# Patient Record
Sex: FEMALE | Race: WHITE | NOT HISPANIC OR LATINO
[De-identification: names, ages, dates, MRNs, and addresses within clinical notes are randomized per-mention and may not be internally consistent; named-entity substitution may affect disease eponyms.]

---

## 2024-06-05 ENCOUNTER — DASHBOARD ENCOUNTERS (OUTPATIENT)
Age: 69
End: 2024-06-05

## 2024-06-05 ENCOUNTER — OFFICE VISIT (OUTPATIENT)
Dept: URBAN - METROPOLITAN AREA CLINIC 72 | Facility: CLINIC | Age: 69
End: 2024-06-05
Payer: MEDICARE

## 2024-06-05 VITALS
SYSTOLIC BLOOD PRESSURE: 122 MMHG | HEIGHT: 64 IN | BODY MASS INDEX: 28.07 KG/M2 | WEIGHT: 164.4 LBS | TEMPERATURE: 97.9 F | HEART RATE: 94 BPM | DIASTOLIC BLOOD PRESSURE: 63 MMHG

## 2024-06-05 DIAGNOSIS — K57.90 DIVERTICULOSIS: ICD-10-CM

## 2024-06-05 DIAGNOSIS — R16.1 SPLENOMEGALY: ICD-10-CM

## 2024-06-05 DIAGNOSIS — R14.0 ABDOMINAL BLOATING: ICD-10-CM

## 2024-06-05 DIAGNOSIS — K76.0 STEATOSIS, LIVER: ICD-10-CM

## 2024-06-05 PROBLEM — 197321007: Status: ACTIVE | Noted: 2024-06-05

## 2024-06-05 PROBLEM — 397881000: Status: ACTIVE | Noted: 2024-06-05

## 2024-06-05 PROCEDURE — 99203 OFFICE O/P NEW LOW 30 MIN: CPT | Performed by: INTERNAL MEDICINE

## 2024-06-05 RX ORDER — MULTIVIT-MIN/FA/LYCOPEN/LUTEIN .4-300-25
TAKE 1 TABLET DAILY TABLET ORAL
Refills: 0 | Status: ACTIVE | COMMUNITY
Start: 2018-02-13

## 2024-06-05 RX ORDER — LOSARTAN POTASSIUM 50 MG/1
TABLET, FILM COATED ORAL
Qty: 60 TABLET | Status: ACTIVE | COMMUNITY

## 2024-06-05 RX ORDER — FENOFIBRIC ACID 135 MG/1
TAKE 1 CAPSULE DAILY CAPSULE, DELAYED RELEASE ORAL
Refills: 0 | Status: ACTIVE | COMMUNITY
Start: 2018-02-13

## 2024-06-05 RX ORDER — LEVOTHYROXINE SODIUM 0.09 MG/1
TAKE 1 TABLET DAILY TABLET ORAL
Refills: 0 | Status: ACTIVE | COMMUNITY
Start: 2018-02-13

## 2024-06-05 RX ORDER — B-COMPLEX WITH VITAMIN C
TAKE 1 TABLET DAILY TABLET ORAL
Refills: 0 | Status: ACTIVE | COMMUNITY
Start: 2018-02-13

## 2024-06-05 RX ORDER — INSULIN GLARGINE 100 [IU]/ML
INJECT 33 UNITS IN THE AM AND 4 UNITS AT BEDTIME INJECTION, SOLUTION SUBCUTANEOUS
Refills: 0 | Status: ACTIVE | COMMUNITY
Start: 2018-02-13

## 2024-06-05 RX ORDER — CYCLOSPORINE 0.5 MG/ML
INSTILL ONE DROP INTO EACH EYE TWICE DAILY EMULSION OPHTHALMIC
Qty: 60 UNSPECIFIED | Refills: 0 | Status: ACTIVE | COMMUNITY

## 2024-06-05 RX ORDER — LIOTHYRONINE SODIUM 5 UG/1
TAKE 1/2 TABLET DAILY TABLET ORAL
Refills: 0 | Status: ON HOLD | COMMUNITY
Start: 2018-02-13

## 2024-06-05 RX ORDER — INSULIN ASPART 100 [IU]/ML
INJECT 11 UNITS AT BREAKFAST, AND 12 UNITS AT LUNCH INJECTION, SUSPENSION SUBCUTANEOUS
Refills: 0 | Status: ON HOLD | COMMUNITY
Start: 2018-02-13

## 2024-06-05 RX ORDER — ERGOCALCIFEROL 1.25 MG/1
TAKE ONE CAPSULE BY MOUTH EVERY WEEK CAPSULE, LIQUID FILLED ORAL
Qty: 12 UNSPECIFIED | Refills: 2 | Status: ACTIVE | COMMUNITY

## 2024-06-05 RX ORDER — INSULIN ASPART INJECTION 100 [IU]/ML
USE IN DIVIDED DOSE PER CARB INTAKE AS INTRUCTED ON SLIDING SCALE. MAXIMUM DAILY DOSE OF 80 UNITS INJECTION, SOLUTION SUBCUTANEOUS
Qty: 20 UNSPECIFIED | Refills: 5 | Status: ACTIVE | COMMUNITY

## 2024-06-05 NOTE — EXAM-PHYSICAL EXAM
General--no acute distress, resting comfortably Eyes--anicteric, no pallor HENT--normocephalic, atraumatic head Neck--no lymphadenopathy, non tender Chest--non labored breathing, equal rise Abdomen--soft, non tender, non distended, no organomegaly Ext: MILANA, no obvious sores or rashes

## 2024-06-05 NOTE — HPI-TODAY'S VISIT:
Mrs. Persaud is a 68-year-old female who presents as a new patient for consultation for splenomegaly, abdominal distention and fatty liver, she was referred by Dr. Mary Ho, a copy of this consultation will be forwarded to the referring physician.  She has history of type 1 diabetes.  She had a hernia repair in 2021.  She saw her primary care doctor with a complaint of abdominal distention bloating and pain and a CT scan of the abdomen pelvis with contrast was ordered.  Showed a normal liver with some fatty infiltration, no ductal dilatation, 1 or more calcified gallstones in the gallbladder lumen, splenomegaly to 14.68 cm long, diverticulosis without diverticulitis and minimal atherosclerotic changes in the aorta  Lab work 5/16/2023 showed a creatinine of 1.08, sodium 141, bilirubin 0.4, alk phos 73, AST 21, ALT 22, slightly low IgA levels negative celiac profile otherwise She denies any vague abdominal discomfort, no pain particularly.  She queries if it is related to her mesh versus gallstones versus metabolic syndrome.  She is more concerned about the bloating and distention of her abdomen, she does wear Dexcom.  She is somewhat insulin resistant.  She believes that the adipose tissue that has accumulated there has been gradual over the last 4 to 5 years.  No nausea or vomiting, no reflux-like symptoms, no edema.

## 2024-07-23 ENCOUNTER — OFFICE VISIT (OUTPATIENT)
Dept: URBAN - METROPOLITAN AREA CLINIC 72 | Facility: CLINIC | Age: 69
End: 2024-07-23